# Patient Record
Sex: FEMALE | Race: WHITE | NOT HISPANIC OR LATINO | Employment: FULL TIME | URBAN - METROPOLITAN AREA
[De-identification: names, ages, dates, MRNs, and addresses within clinical notes are randomized per-mention and may not be internally consistent; named-entity substitution may affect disease eponyms.]

---

## 2023-05-18 ENCOUNTER — OFFICE VISIT (OUTPATIENT)
Dept: FAMILY MEDICINE | Facility: CLINIC | Age: 28
End: 2023-05-18
Payer: COMMERCIAL

## 2023-05-18 VITALS
HEIGHT: 64 IN | SYSTOLIC BLOOD PRESSURE: 120 MMHG | DIASTOLIC BLOOD PRESSURE: 68 MMHG | OXYGEN SATURATION: 97 % | WEIGHT: 181.13 LBS | BODY MASS INDEX: 30.92 KG/M2 | TEMPERATURE: 97.8 F | HEART RATE: 84 BPM

## 2023-05-18 DIAGNOSIS — Z11.59 NEED FOR HEPATITIS C SCREENING TEST: ICD-10-CM

## 2023-05-18 DIAGNOSIS — Z11.4 ENCOUNTER FOR SCREENING FOR HUMAN IMMUNODEFICIENCY VIRUS (HIV): ICD-10-CM

## 2023-05-18 DIAGNOSIS — N02.B9 IGA NEPHROPATHY: Primary | ICD-10-CM

## 2023-05-18 LAB
ANION GAP SERPL CALCULATED.3IONS-SCNC: 10 MMOL/L (ref 7–15)
BUN SERPL-MCNC: 17.6 MG/DL (ref 6–20)
CALCIUM SERPL-MCNC: 9.4 MG/DL (ref 8.6–10)
CHLORIDE SERPL-SCNC: 103 MMOL/L (ref 98–107)
CREAT SERPL-MCNC: 1.03 MG/DL (ref 0.51–0.95)
DEPRECATED HCO3 PLAS-SCNC: 29 MMOL/L (ref 22–29)
GFR SERPL CREATININE-BSD FRML MDRD: 76 ML/MIN/1.73M2
GLUCOSE SERPL-MCNC: 98 MG/DL (ref 70–99)
POTASSIUM SERPL-SCNC: 3.7 MMOL/L (ref 3.4–5.3)
SODIUM SERPL-SCNC: 142 MMOL/L (ref 136–145)

## 2023-05-18 PROCEDURE — 36415 COLL VENOUS BLD VENIPUNCTURE: CPT | Performed by: FAMILY MEDICINE

## 2023-05-18 PROCEDURE — 86803 HEPATITIS C AB TEST: CPT | Performed by: FAMILY MEDICINE

## 2023-05-18 PROCEDURE — 99204 OFFICE O/P NEW MOD 45 MIN: CPT | Performed by: FAMILY MEDICINE

## 2023-05-18 PROCEDURE — 80048 BASIC METABOLIC PNL TOTAL CA: CPT | Performed by: FAMILY MEDICINE

## 2023-05-18 PROCEDURE — 87389 HIV-1 AG W/HIV-1&-2 AB AG IA: CPT | Performed by: FAMILY MEDICINE

## 2023-05-18 RX ORDER — LISINOPRIL 5 MG/1
5 TABLET ORAL DAILY
Qty: 90 TABLET | Refills: 4 | Status: SHIPPED | OUTPATIENT
Start: 2023-05-18 | End: 2023-07-18

## 2023-05-18 NOTE — PROGRESS NOTES
"  Assessment & Plan     ASSESSMENT/ORDERS:    ICD-10-CM    1. IgA nephropathy  N02.8 Adult Nephrology  Referral     lisinopril (ZESTRIL) 5 MG tablet     Basic metabolic panel  (Ca, Cl, CO2, Creat, Gluc, K, Na, BUN)     Basic metabolic panel  (Ca, Cl, CO2, Creat, Gluc, K, Na, BUN)      2. Need for hepatitis C screening test  Z11.59 Hepatitis C Screen Reflex to HCV RNA Quant and Genotype     Hepatitis C Screen Reflex to HCV RNA Quant and Genotype      3. Encounter for screening for human immunodeficiency virus (HIV)  Z11.4 HIV Antigen Antibody Combo     HIV Antigen Antibody Combo        PLAN:  1.  Referral to nephrology to establish care regarding management of IgA nephropathy and to follow her care when she decides to become pregnant.  Refilled lisinopril today, labs as above.  2.  Appointment for preventative visit with IUD removal recommended this summer.  Noted that I recommended she start prenatal vitamin now and know that she needs to be off ACE-I when she gets pregnant.     Follow-up Visit   Expected date:  Jun 18, 2023 (Approximate)      Follow Up Appointment Details:     Follow-up with whom?: Me    Follow-Up for what?: Adult Preventive    Any Additional Chronic Condition Management?: General (Other)    Additional Details: pap and IUD removal    How?: In Person                               BMI:   Estimated body mass index is 31.09 kg/m  as calculated from the following:    Height as of this encounter: 1.626 m (5' 4\").    Weight as of this encounter: 82.2 kg (181 lb 2 oz).           Matteo Choe MD  [facility] TIERA Iyer is a 27 year old, presenting for the following health issues:  Establish Care and Consult (IUD REMOVAL/)        5/18/2023     3:03 PM   Additional Questions   Roomed by Radha COLINDRES MA     History of Present Illness       Reason for visit:  To establish care and schedule apt. to remove IUD    She eats 2-3 servings of fruits and vegetables " "daily.She consumes 0 sweetened beverage(s) daily.She exercises with enough effort to increase her heart rate 30 to 60 minutes per day.  She exercises with enough effort to increase her heart rate 5 days per week.   She is taking medications regularly.           Establishing care.  History of IgA nephropathy.  Has not seen nephrology for a few years.  Was on ACE-I for disease treatment.      Interested in IUD removal, wants to know how to get this done.  Had severe pain when placed 2 years ago.      Also due for pap test.    Review of Systems         Objective    /68   Pulse 84   Temp 97.8  F (36.6  C) (Temporal)   Ht 1.626 m (5' 4\")   Wt 82.2 kg (181 lb 2 oz)   SpO2 97%   BMI 31.09 kg/m    Body mass index is 31.09 kg/m .  Physical Exam  Constitutional:       Appearance: She is well-developed.   Cardiovascular:      Rate and Rhythm: Normal rate and regular rhythm.      Heart sounds: Normal heart sounds, S1 normal and S2 normal. No murmur heard.  Pulmonary:      Effort: Pulmonary effort is normal. No respiratory distress.      Breath sounds: Normal breath sounds. No wheezing, rhonchi or rales.   Neurological:      Mental Status: She is alert.                            "

## 2023-05-19 LAB
HCV AB SERPL QL IA: NONREACTIVE
HIV 1+2 AB+HIV1 P24 AG SERPL QL IA: NONREACTIVE

## 2023-05-22 NOTE — RESULT ENCOUNTER NOTE
Shireen,  Your results of your kidney function are consistent with your last check.  Your other screenings were negative.  It was great to meet you last week and I see you have your preventative appointment set up for July.  Please let me know if you have any questions before then.    Sincerely,  Dr. Choe

## 2023-06-01 ENCOUNTER — HEALTH MAINTENANCE LETTER (OUTPATIENT)
Age: 28
End: 2023-06-01

## 2023-06-12 NOTE — CONFIDENTIAL NOTE
DIAGNOSIS:  IgA nephropathy   DATE RECEIVED: 09.14.2023   NOTES STATUS DETAILS   OFFICE NOTE from referring provider Internal 05.18.2023 Matteo Choe MD   OFFICE NOTE from other specialist      *Only VASCULITIS or LUPUS gather office notes for the following     *PULMONARY       *ENT     *DERMATOLOGY     *RHEUMATOLOGY     DISCHARGE SUMMARY from hospital     DISCHARGE REPORT from the ER     MEDICATION LIST Internal    IMAGING  (NEED IMAGES AND REPORTS)     KIDNEY CT SCAN     KIDNEY ULTRASOUND     MR ABDOMEN     NUCLEAR MEDICINE RENAL     LABS     CBC Care Everywhere 10.27.2020   CMP Care Everywhere 10.27.2020   BMP Internal 05.18.2023   UA Care Everywhere 10.27.2020   URINE PROTEIN Care Everywhere 10.27.2020   RENAL PANEL     BIOPSY     KIDNEY BIOPSY

## 2023-07-18 ENCOUNTER — OFFICE VISIT (OUTPATIENT)
Dept: FAMILY MEDICINE | Facility: CLINIC | Age: 28
End: 2023-07-18
Attending: FAMILY MEDICINE
Payer: COMMERCIAL

## 2023-07-18 VITALS
HEIGHT: 64 IN | OXYGEN SATURATION: 98 % | HEART RATE: 78 BPM | BODY MASS INDEX: 30.39 KG/M2 | WEIGHT: 178 LBS | TEMPERATURE: 97.1 F | DIASTOLIC BLOOD PRESSURE: 66 MMHG | SYSTOLIC BLOOD PRESSURE: 118 MMHG | RESPIRATION RATE: 18 BRPM

## 2023-07-18 DIAGNOSIS — Z30.432 ENCOUNTER FOR REMOVAL OF INTRAUTERINE CONTRACEPTIVE DEVICE: ICD-10-CM

## 2023-07-18 DIAGNOSIS — N02.B9 IGA NEPHROPATHY: ICD-10-CM

## 2023-07-18 DIAGNOSIS — Z12.4 CERVICAL CANCER SCREENING: ICD-10-CM

## 2023-07-18 DIAGNOSIS — Z00.00 ROUTINE GENERAL MEDICAL EXAMINATION AT A HEALTH CARE FACILITY: Primary | ICD-10-CM

## 2023-07-18 LAB
CHOLEST SERPL-MCNC: 199 MG/DL
HDLC SERPL-MCNC: 74 MG/DL
LDLC SERPL CALC-MCNC: 108 MG/DL
NONHDLC SERPL-MCNC: 125 MG/DL
TRIGL SERPL-MCNC: 84 MG/DL

## 2023-07-18 PROCEDURE — G0145 SCR C/V CYTO,THINLAYER,RESCR: HCPCS | Performed by: FAMILY MEDICINE

## 2023-07-18 PROCEDURE — 99395 PREV VISIT EST AGE 18-39: CPT | Performed by: FAMILY MEDICINE

## 2023-07-18 PROCEDURE — 80061 LIPID PANEL: CPT | Performed by: FAMILY MEDICINE

## 2023-07-18 PROCEDURE — 36415 COLL VENOUS BLD VENIPUNCTURE: CPT | Performed by: FAMILY MEDICINE

## 2023-07-18 ASSESSMENT — ENCOUNTER SYMPTOMS
NERVOUS/ANXIOUS: 0
DIARRHEA: 0
JOINT SWELLING: 0
CONSTIPATION: 0
HEADACHES: 0
NAUSEA: 0
BREAST MASS: 0
HEMATOCHEZIA: 0
ARTHRALGIAS: 0
WEAKNESS: 0
SORE THROAT: 0
SHORTNESS OF BREATH: 0
CHILLS: 0
FEVER: 0
MYALGIAS: 0
HEARTBURN: 0
ABDOMINAL PAIN: 0
EYE PAIN: 0
COUGH: 0
PARESTHESIAS: 0
DYSURIA: 0
FREQUENCY: 0
HEMATURIA: 0
PALPITATIONS: 0
DIZZINESS: 0

## 2023-07-18 NOTE — RESULT ENCOUNTER NOTE
Shireen,  Your results are clinically good.  Please let me know if you have any questions.    Sincerely,  Dr. Choe

## 2023-07-18 NOTE — PATIENT INSTRUCTIONS
[employer] sites for health information:  American Academy of Pediatrics parent information:  www.healthychildren.org  American Academy of Family Physicians patient information:  www.familydoctor.org  [place] Department of Health:  www.health.[facility].[place]  Center for Disease Control and Prevention:  www.cdc.gov    Preventive Health Recommendations  Female Ages 26 - 39  Yearly exam:   See your health care provider every year in order to  Review health changes.   Discuss preventive care.    Review your medicines if you your doctor has prescribed any.    Until age 30: Get a Pap test every three years (more often if you have had an abnormal result).    After age 30: Talk to your doctor about whether you should have a Pap test every 3 years or have a Pap test with HPV screening every 5 years.   You do not need a Pap test if your uterus was removed (hysterectomy) and you have not had cancer.  You should be tested each year for STDs (sexually transmitted diseases), if you're at risk.   Talk to your provider about how often to have your cholesterol checked.  If you are at risk for diabetes, you should have a diabetes test (fasting glucose).  Shots: Get a flu shot each year. Get a tetanus shot every 10 years.   Nutrition:   Eat at least 5 servings of fruits and vegetables each day.  Eat whole-grain bread, whole-wheat pasta and brown rice instead of white grains and rice.  Get adequate Calcium and Vitamin D.     Lifestyle  Exercise at least 150 minutes a week (30 minutes a day, 5 days of the week). This will help you control your weight and prevent disease.  Limit alcohol to one drink per day.  No smoking.   Wear sunscreen to prevent skin cancer.  See your dentist every six months for an exam and cleaning.

## 2023-07-18 NOTE — PROGRESS NOTES
SUBJECTIVE:   CC: Shireen is an 27 year old who presents for preventive health visit.       2023    10:47 AM   Additional Questions   Roomed by Radha COLINDRES MA     Healthy Habits:     Getting at least 3 servings of Calcium per day:  Yes    Bi-annual eye exam:  NO    Dental care twice a year:  Yes    Sleep apnea or symptoms of sleep apnea:  None    Diet:  Regular (no restrictions)    Frequency of exercise:  4-5 days/week    Duration of exercise:  45-60 minutes    Taking medications regularly:  Yes    Medication side effects:  None    Additional concerns today:  No      History of IgA nephropathy. On ACE-I.  Wants IUD out today.  Planning to get pregnant at some point.             Have you ever done Advance Care Planning? (For example, a Health Directive, POLST, or a discussion with a medical provider or your loved ones about your wishes): No, advance care planning information given to patient to review.  Patient declined advance care planning discussion at this time.    Social History     Tobacco Use     Smoking status: Never     Smokeless tobacco: Never   Substance Use Topics     Alcohol use: Not Currently     Comment: Will have 1 drink a month             2023    12:18 AM   Alcohol Use   Prescreen: >3 drinks/day or >7 drinks/week? No     Reviewed orders with patient.  Reviewed health maintenance and updated orders accordingly - Yes      Breast Cancer Screenin/18/2023    12:19 AM   Breast CA Risk Assessment (FHS-7)   Do you have a family history of breast, colon, or ovarian cancer? No / Unknown         Patient under 40 years of age: Routine Mammogram Screening not recommended.   Pertinent mammograms are reviewed under the imaging tab.    History of abnormal Pap smear: NO - age 21-29 PAP every 3 years recommended     Reviewed and updated as needed this visit by clinical staff   Tobacco  Allergies  Meds              Reviewed and updated as needed this visit by Provider                "      Review of Systems   Constitutional:  Negative for chills and fever.   HENT:  Negative for congestion, ear pain, hearing loss and sore throat.    Eyes:  Negative for pain and visual disturbance.   Respiratory:  Negative for cough and shortness of breath.    Cardiovascular:  Negative for chest pain, palpitations and peripheral edema.   Gastrointestinal:  Negative for abdominal pain, constipation, diarrhea, heartburn, hematochezia and nausea.   Breasts:  Negative for tenderness, breast mass and discharge.   Genitourinary:  Negative for dysuria, frequency, genital sores, hematuria, pelvic pain, urgency, vaginal bleeding and vaginal discharge.   Musculoskeletal:  Negative for arthralgias, joint swelling and myalgias.   Skin:  Negative for rash.   Neurological:  Negative for dizziness, weakness, headaches and paresthesias.   Psychiatric/Behavioral:  Negative for mood changes. The patient is not nervous/anxious.           OBJECTIVE:   /66   Pulse 78   Temp 97.1  F (36.2  C) (Temporal)   Resp 18   Ht 1.626 m (5' 4\")   Wt 80.7 kg (178 lb)   LMP  (LMP Unknown)   SpO2 98%   BMI 30.55 kg/m    Physical Exam  Exam conducted with a chaperone present.   Constitutional:       General: She is not in acute distress.     Appearance: Normal appearance. She is well-developed.   HENT:      Right Ear: Hearing, tympanic membrane, ear canal and external ear normal.      Left Ear: Hearing, tympanic membrane, ear canal and external ear normal.      Nose: Nose normal.      Mouth/Throat:      Pharynx: Uvula midline. No oropharyngeal exudate or posterior oropharyngeal erythema.   Eyes:      General: Lids are normal.         Right eye: No discharge.         Left eye: No discharge.      Conjunctiva/sclera: Conjunctivae normal.      Pupils: Pupils are equal, round, and reactive to light.   Neck:      Thyroid: No thyromegaly.      Trachea: No tracheal deviation.   Cardiovascular:      Rate and Rhythm: Normal rate and regular " rhythm.      Pulses: Normal pulses.      Heart sounds: Normal heart sounds, S1 normal and S2 normal. No murmur heard.     No friction rub. No S3 or S4 sounds.   Pulmonary:      Effort: Pulmonary effort is normal. No respiratory distress.      Breath sounds: Normal breath sounds. No wheezing or rales.   Abdominal:      General: Bowel sounds are normal.      Palpations: Abdomen is soft. There is no mass.      Tenderness: There is no abdominal tenderness.   Genitourinary:     Labia:         Right: No rash, tenderness, lesion or injury.         Left: No rash, tenderness, lesion or injury.       Vagina: No vaginal discharge or bleeding.      Cervix: No discharge or friability.      Comments: Pap obtained.  IUD string visualized coming from cervix.  Ring forceps used to clamp onto string and gentle traction was used to completely remove device.  It was inspected, found to be intact, and discarded per facility protocol.   Musculoskeletal:         General: Normal range of motion.      Cervical back: Normal range of motion and neck supple.   Lymphadenopathy:      Cervical: No cervical adenopathy.      Upper Body:      Right upper body: No supraclavicular adenopathy.      Left upper body: No supraclavicular adenopathy.   Skin:     General: Skin is warm and dry.      Findings: No rash.   Neurological:      Mental Status: She is alert and oriented to person, place, and time.      Cranial Nerves: No cranial nerve deficit.      Sensory: No sensory deficit.      Motor: No abnormal muscle tone.      Deep Tendon Reflexes: Reflexes are normal and symmetric.   Psychiatric:         Thought Content: Thought content normal.         Judgment: Judgment normal.               ASSESSMENT/PLAN:   ASSESSMENT/ORDERS:    ICD-10-CM    1. Routine general medical examination at a health care facility  Z00.00       2. Cervical cancer screening  Z12.4 Pap Screen reflex to HPV if ASCUS - recommend age 25 - 29      3. IgA nephropathy  N02.8 Lipid panel  "reflex to direct LDL Non-fasting     Lipid panel reflex to direct LDL Non-fasting      4. Encounter for removal of intrauterine contraceptive device  Z30.432         PLAN:  1.  Has nephrology appointment in 6 weeks.  Will have them give recommendations now that she needs to stop ACE-I due to possible pregnancy.  Lipid done in advance of that appointment, statin will not be recommended for possible planned pregnancy.          COUNSELING:  Reviewed preventive health counseling, as reflected in patient instructions      BMI:   Estimated body mass index is 30.55 kg/m  as calculated from the following:    Height as of this encounter: 1.626 m (5' 4\").    Weight as of this encounter: 80.7 kg (178 lb).   Weight management plan: Discussed healthy diet and exercise guidelines      She reports that she has never smoked. She has never used smokeless tobacco.          Matteo Choe MD  [facility]  "

## 2023-07-22 LAB
BKR LAB AP GYN ADEQUACY: NORMAL
BKR LAB AP GYN INTERPRETATION: NORMAL
BKR LAB AP HPV REFLEX: NORMAL
BKR LAB AP PREVIOUS ABNORMAL: NORMAL
PATH REPORT.COMMENTS IMP SPEC: NORMAL
PATH REPORT.COMMENTS IMP SPEC: NORMAL
PATH REPORT.RELEVANT HX SPEC: NORMAL

## 2023-08-29 DIAGNOSIS — N02.B9 IGA NEPHROPATHY: Primary | ICD-10-CM

## 2023-09-14 ENCOUNTER — PRE VISIT (OUTPATIENT)
Dept: NEPHROLOGY | Facility: CLINIC | Age: 28
End: 2023-09-14
Payer: COMMERCIAL

## 2024-06-18 ENCOUNTER — PATIENT OUTREACH (OUTPATIENT)
Dept: CARE COORDINATION | Facility: CLINIC | Age: 29
End: 2024-06-18
Payer: COMMERCIAL

## 2024-07-02 ENCOUNTER — PATIENT OUTREACH (OUTPATIENT)
Dept: CARE COORDINATION | Facility: CLINIC | Age: 29
End: 2024-07-02
Payer: COMMERCIAL

## 2024-07-23 ENCOUNTER — VIRTUAL VISIT (OUTPATIENT)
Dept: FAMILY MEDICINE | Facility: CLINIC | Age: 29
End: 2024-07-23
Payer: COMMERCIAL

## 2024-07-23 DIAGNOSIS — Z34.90 SUPERVISION OF NORMAL PREGNANCY: Primary | ICD-10-CM

## 2024-07-23 PROCEDURE — 99207 PR NO CHARGE NURSE ONLY: CPT | Mod: 93

## 2024-07-23 NOTE — PATIENT INSTRUCTIONS
Learning About Pregnancy  Your Care Instructions     Your health in the early weeks of your pregnancy is particularly important for your baby's health. Take good care of yourself. Anything you do that harms your body can also harm your baby.  Make sure to go to all of your doctor appointments. Regular checkups will help keep you and your baby healthy.  How can you care for yourself at home?  Diet    Choose healthy foods like fruits, vegetables, whole grains, lean proteins, and healthy fats.     Choose foods that are good sources of calcium, iron, and folate. You can try dairy products, dark leafy greens, fortified orange juice and cereals, almonds, broccoli, dried fruit, and beans.     Do not skip meals or go for many hours without eating. If you are nauseated, try to eat a small, healthy snack every 2 to 3 hours.     Avoid fish that are high in mercury. These include shark, swordfish, samantha mackerel, marlin, orange roughy, and bigeye tuna, as well as tilefish from the [facility].     It's okay to eat up to 8 to 12 ounces a week of fish that are low in mercury or up to 4 ounces a week of fish that have medium levels of mercury. Some fish that are low in mercury are salmon, shrimp, canned light tuna, cod, and tilapia. Some fish that have medium levels of mercury are halibut and white albacore tuna.     Drink plenty of fluids. If you have kidney, heart, or liver disease and have to limit fluids, talk with your doctor before you increase the amount of fluids you drink.     Limit caffeine to about 200 to 300 mg per day. On average, a cup of brewed coffee has around 80 to 100 mg of caffeine.     Do not drink alcohol, such as beer, wine, or hard liquor.     Take a multivitamin that contains at least 400 micrograms (mcg) of folic acid to help prevent birth defects. Fortified cereal and whole wheat bread are good additional sources of folic acid.     Increase the calcium in your diet. Try to drink a quart of skim milk  each day. You may also take calcium supplements and choose foods such as cheese and yogurt.   Lifestyle    Make sure you go to your follow-up appointments.     Get plenty of rest. You may be unusually tired while you are pregnant.     Get at least 30 minutes of exercise on most days of the week. Walking is a good choice. If you have not exercised in the past, start out slowly. Take several short walks each day.     Do not smoke. If you need help quitting, talk to your doctor about stop-smoking programs. These can increase your chances of quitting for good.     Do not touch cat feces or litter boxes. Also, wash your hands after you handle raw meat, and fully cook all meat before you eat it. Wear gloves when you work in the yard or garden, and wash your hands well when you are done. Cat feces, raw or undercooked meat, and contaminated dirt can cause an infection that may harm your baby or lead to a miscarriage.     Avoid things that can make your body too hot and may be harmful to your baby, such as a hot tub or sauna. Or talk with your doctor before doing anything that raises your body temperature. Your doctor can tell you if it's safe.     Avoid chemical fumes, paint fumes, or poisons.     Do not use illegal drugs, marijuana, or alcohol.   Medicines    Review all of your medicines with your doctor. Some of your routine medicines may need to be changed to protect your baby.     Use acetaminophen (Tylenol) to relieve minor problems, such as a mild headache or backache or a mild fever with cold symptoms. Do not use nonsteroidal anti-inflammatory drugs (NSAIDs), such as ibuprofen (Advil, Motrin) or naproxen (Aleve), unless your doctor says it is okay.     Do not take two or more pain medicines at the same time unless the doctor told you to. Many pain medicines have acetaminophen, which is Tylenol. Too much acetaminophen (Tylenol) can be harmful.     Take your medicines exactly as prescribed. Call your doctor if you  "think you are having a problem with your medicine.   To manage morning sickness    Keep food in your stomach, but not too much at once. Try eating five or six small meals a day instead of three large meals.     For nausea when you wake up, eat a small snack, such as a couple of crackers or pretzels, before rising. Allow a few minutes for your stomach to settle before you slowly get up.     Try to avoid smells and foods that make you feel nauseated. High-fat or greasy foods, milk, and coffee may make nausea worse. Some foods that may be easier to tolerate include cold, spicy, sour, and salty foods.     Drink enough fluids. Water and other caffeine-free drinks are good choices.     Take your prenatal vitamins at night on a full stomach.     Try foods and drinks made with latrice. Latrice may help with nausea.     Get lots of rest. Morning sickness may be worse when you are tired.     Talk to your doctor about over-the-counter products, such as vitamin B6 or doxylamine, to help relieve symptoms.     Try a P6 acupressure wrist band. These anti-nausea wristbands help some people.   Follow-up care is a key part of your treatment and safety. Be sure to make and go to all appointments, and call your doctor if you are having problems. It's also a good idea to know your test results and keep a list of the medicines you take.  Where can you learn more?  Go to https://www.Ink361.net/patiented  Enter E868 in the search box to learn more about \"Learning About Pregnancy.\"  Current as of: July 10, 2023               Content Version: 14.0    8639-1774 [employer].   Care instructions adapted under license by your healthcare professional. If you have questions about a medical condition or this instruction, always ask your healthcare professional. [employer] disclaims any warranty or liability for your use of this information.      Weeks 6 to 10 of Your Pregnancy: Care Instructions  During these weeks of " "pregnancy, your body goes through many changes. You may start to feel different, both in your body and your emotions. Each pregnancy is different, so there's no \"right\" way to feel. These early weeks are a time to make healthy choices for you and your pregnancy.    Take a daily prenatal vitamin. Choose one with folic acid in it.    Avoid alcohol, tobacco, and drugs (including marijuana). If you need help quitting, talk to your doctor.    Drink plenty of liquids.  Be sure to drink enough water. And limit sodas, other sweetened drinks, and caffeine.     Choose foods that are good sources of calcium, iron, and folate.  You can try dairy products, dark leafy greens, fortified orange juice and cereals, almonds, broccoli, dried fruit, and beans.     Avoid foods that may be harmful.  Don't eat raw meat, deli meat, raw seafood, or raw eggs. Avoid soft cheese and unpasteurized dairy, like Brie and blue cheese. And don't eat fish that contains a lot of mercury, like shark and swordfish.     Don't touch carline litter or cat poop.  They can cause an infection that could be harmful during pregnancy.     Avoid things that can make your body too hot.  For example, avoid hot tubs and saunas.     Soothe morning sickness.  Try eating 5 or 6 small meals a day, getting some fresh air, or using matt to control symptoms.     Ask your doctor about flu and COVID-19 shots.  Getting them can help protect against infection.   Follow-up care is a key part of your treatment and safety. Be sure to make and go to all appointments, and call your doctor if you are having problems. It's also a good idea to know your test results and keep a list of the medicines you take.  Where can you learn more?  Go to https://www.Advanced Battery Concepts.net/patiented  Enter G112 in the search box to learn more about \"Weeks 6 to 10 of Your Pregnancy: Care Instructions.\"  Current as of: July 10, 2023               Content Version: 14.0    2852-1063 [employer]. "   Care instructions adapted under license by your healthcare professional. If you have questions about a medical condition or this instruction, always ask your healthcare professional. [employer] disclaims any warranty or liability for your use of this information.         Managing Morning Sickness (01:55)  Your health professional recommends that you watch this short online health video.  Learn tips for dealing with morning sickness, no matter what time of day you have it.  Purpose:  Gives tips for managing morning sickness, including eating small low-fat meals and avoiding caffeine and spicy food.  Goal:  The user will learn tips for dealing with morning sickness during pregnancy.     How to watch the video    Scan the QR code   OR Visit the website    [email or web address]   Current as of: July 10, 2023               Content Version: 14.0    8659-1917 [employer].   Care instructions adapted under license by your healthcare professional. If you have questions about a medical condition or this instruction, always ask your healthcare professional. [employer] disclaims any warranty or liability for your use of this information.      Pregnancy and Heartburn: Care Instructions  Overview     Heartburn is a common problem during pregnancy.  Heartburn happens when stomach acid backs up into the tube that carries food to the stomach. This tube is called the esophagus. Early in pregnancy, heartburn is caused by hormone changes that slow down digestion. Later on, it's also caused by the large uterus pushing up on the stomach.  Even though you can't fix the cause, there are things you can do to get relief. Treating heartburn during pregnancy focuses first on making lifestyle changes, like changing what and how you eat, and on taking medicines.  Heartburn usually improves or goes away after childbirth.  Follow-up care is a key part of your treatment and safety. Be sure to make  "and go to all appointments, and call your doctor if you are having problems. It's also a good idea to know your test results and keep a list of the medicines you take.  How can you care for yourself at home?  Eat small, frequent meals.  Avoid foods that make your symptoms worse, such as chocolate, peppermint, and spicy foods. Avoid drinks with caffeine, such as coffee, tea, and sodas.  Avoid bending over or lying down after meals.  Take a short walk after you eat.  If heartburn is a problem at night, do not eat for 2 hours before bedtime.  Take antacids like Mylanta, Maalox, Rolaids, or Tums. Do not take antacids that have sodium bicarbonate, magnesium trisilicate, or aspirin. Be careful when you take over-the-counter antacid medicines. Many of these medicines have aspirin in them. While you are pregnant, do not take aspirin or medicines that contain aspirin unless your doctor says it is okay.  If you're not getting relief, talk to your doctor. You may be able to take a stronger acid-reducing medicine.  When should you call for help?   Call your doctor now or seek immediate medical care if:    You have new or worse belly pain.     You are vomiting.   Watch closely for changes in your health, and be sure to contact your doctor if:    You have new or worse symptoms of reflux.     You are losing weight.     You have trouble or pain swallowing.     You do not get better as expected.   Where can you learn more?  Go to https://[email or web address].[email or web address] / Founders.net/patiented  Enter U946 in the search box to learn more about \"Pregnancy and Heartburn: Care Instructions.\"  Current as of: July 10, 2023               Content Version: 14.0    8575-4232 [employer].   Care instructions adapted under license by your healthcare professional. If you have questions about a medical condition or this instruction, always ask your healthcare professional. [employer] disclaims any warranty or liability for your use of this " information.      Constipation: Care Instructions  Overview     Constipation means that you have a hard time passing stools (bowel movements). People pass stools from 3 times a day to once every 3 days. What is normal for you may be different. Constipation may occur with pain in the rectum and cramping. The pain may get worse when you try to pass stools. Sometimes there are small amounts of bright red blood on toilet paper or the surface of stools. This is because of enlarged veins near the rectum (hemorrhoids).  A few changes in your diet and lifestyle may help you avoid ongoing constipation. Your doctor may also prescribe medicine to help loosen your stool.  Some medicines can cause constipation. These include pain medicines and antidepressants. Tell your doctor about all the medicines you take. Your doctor may want to make a medicine change to ease your symptoms.  Follow-up care is a key part of your treatment and safety. Be sure to make and go to all appointments, and call your doctor if you are having problems. It's also a good idea to know your test results and keep a list of the medicines you take.  How can you care for yourself at home?  Drink plenty of fluids. If you have kidney, heart, or liver disease and have to limit fluids, talk with your doctor before you increase the amount of fluids you drink.  Include high-fiber foods in your diet each day. These include fruits, vegetables, beans, and whole grains.  Get at least 30 minutes of exercise on most days of the week. Walking is a good choice. You also may want to do other activities, such as running, swimming, cycling, or playing tennis or team sports.  Take a fiber supplement, such as Citrucel or Metamucil, every day. Read and follow all instructions on the label.  Schedule time each day for a bowel movement. A daily routine may help. Take your time having a bowel movement, but don't sit for more than 10 minutes at a time. And don't strain too  "much.  Support your feet with a small step stool when you sit on the toilet. This helps flex your hips and places your pelvis in a squatting position.  Your doctor may recommend an over-the-counter laxative to relieve your constipation. Examples are Milk of Magnesia and MiraLax. Read and follow all instructions on the label. Do not use laxatives on a long-term basis.  When should you call for help?   Call your doctor now or seek immediate medical care if:    You have new or worse belly pain.     You have new or worse nausea or vomiting.     You have blood in your stools.   Watch closely for changes in your health, and be sure to contact your doctor if:    Your constipation is getting worse.     You do not get better as expected.   Where can you learn more?  Go to https://www.trueAnthem.net/patiented  Enter P343 in the search box to learn more about \"Constipation: Care Instructions.\"  Current as of: October 19, 2023               Content Version: 14.0    6864-7732 [employer].   Care instructions adapted under license by your healthcare professional. If you have questions about a medical condition or this instruction, always ask your healthcare professional. [employer] disclaims any warranty or liability for your use of this information.      Learning About High-Iron Foods  What foods are high in iron?     The foods you eat contain nutrients, such as vitamins and minerals. Iron is a nutrient. Your body needs the right amount to stay healthy and work as it should. You can use the list below to help you make choices about which foods to eat.  Here are some foods that contain iron. They have 1 to 2 milligrams of iron per serving.  Fruits  Figs (dried), 5 figs  Vegetables  Asparagus (canned), 6 russ  Gideon, beet, Swiss chard, or turnip greens, 1 cup  Dried peas, cooked,   cup  Seaweed, spirulina (dried),   cup  Spinach, (cooked)   cup or (raw) 1 cup  Grains  Cereals, fortified with iron, 1 " "cup  Grits (instant, cooked), fortified with iron,   cup  Meats and other protein foods  Beans (kidney, lima, navy, white), canned or cooked,   cup  Beef or lamb, 3 oz  Chicken giblets, 3 oz  Chickpeas (garbanzo beans),   cup  Liver of beef, lamb, or pork, 3 oz  Oysters (cooked), 3 oz  Sardines (canned), 3 oz  Soybeans (boiled),   cup  Tofu (firm),   cup  Work with your doctor to find out how much of this nutrient you need. Depending on your health, you may need more or less of it in your diet.  Where can you learn more?  Go to https://www.Turing Data.net/patiented  Enter R005 in the search box to learn more about \"Learning About High-Iron Foods.\"  Current as of: September 20, 2023               Content Version: 14.0    8511-0154 [employer].   Care instructions adapted under license by your healthcare professional. If you have questions about a medical condition or this instruction, always ask your healthcare professional. [employer] disclaims any warranty or liability for your use of this information.      Rh Antibodies Screening During Pregnancy: About This Test  What is it?     The Rh antibodies screening test is a blood test. It checks your blood for Rh antibodies. If you have Rh-negative blood and have been exposed to Rh-positive blood, your immune system may make antibodies to attack the Rh-positive blood. When a pregnant woman has these antibodies, it is called Rh sensitization.  Why is this test done?  The Rh antibodies screening test is done during pregnancy to find out if your baby is at risk for Rh disease. This can happen if you have Rh-negative blood and your baby has Rh-positive blood. If your Rh-negative blood mixes with Rh-positive blood, your immune system will make antibodies to attack the Rh-positive blood.  During pregnancy, these antibodies could attach to the baby's red blood cells. This can cause your baby to have serious health problems. The results of this test " "will help your doctor know how to best care for you and your baby during your pregnancy.  How do you prepare for the test?  In general, there's nothing you have to do before this test, unless your doctor tells you to.  How is the test done?  A health professional uses a needle to take a blood sample, usually from the arm.  What happens after the test?  You will probably be able to go home right away. It depends on the reason for the test.  You can go back to your usual activities right away.  Follow-up care is a key part of your treatment and safety. Be sure to make and go to all appointments, and call your doctor if you are having problems. It's also a good idea to keep a list of the medicines you take. Ask your doctor when you can expect to have your test results.  Where can you learn more?  Go to https://www.FIMBex.Hudgeons [employer] Temple/patiented  Enter P722 in the search box to learn more about \"Rh Antibodies Screening During Pregnancy: About This Test.\"  Current as of: July 10, 2023               Content Version: 14.0    2344-0177 [employer].   Care instructions adapted under license by your healthcare professional. If you have questions about a medical condition or this instruction, always ask your healthcare professional. [employer] disclaims any warranty or liability for your use of this information.      Learning About Preventing Rh Disease  What is Rh disease?     Rh disease can be a serious problem in pregnancy. It happens when substances called antibodies in the mother's blood cause red blood cells in her baby's blood to be destroyed. This can occur when the blood types of a mother and her baby do not match.  All blood has an Rh factor. This is what makes a blood type positive or negative. When you are Rh-negative, your baby may be Rh-negative or Rh-positive. If your baby has Rh-positive blood and it mixes with yours, your body will make antibodies. This is called Rh sensitization.  Most of " "the time, this is not a problem in a first pregnancy. But in future pregnancies, it could cause Rh disease.  A  with Rh disease has mild anemia and may have jaundice. In severe cases, anemia, jaundice, and swelling can be very dangerous or fatal. Some babies need to be delivered early. Some need special care in the NICU. A very sick baby will need a blood transfusion before or after birth.  Fortunately, Rh sensitization is usually easy to prevent.  That's why it's important to get your Rh status checked in your first trimester. It doesn't cause any warning signs. A blood test is the only way to know if you are Rh-sensitive or are at risk for it.  How can you prevent Rh disease?  If you are Rh-negative, your doctor gives you an Rh immune globulin shot (such as RhoGAM). It helps prevent your body from making the antibodies that attack your baby's red blood cells.  Timing is important. You need the shot at certain times during your pregnancy. And you need one anytime there is a chance that your baby's blood might mix with yours. That can happen with certain prenatal tests or when you have pregnancy bleeding, such as:  Right after any pregnancy loss, amniocentesis, or CVS testing.  After turning of a breech baby.  Before and maybe after childbirth. Your doctor gives you a shot around week 28. If your  is Rh-positive, you will have another shot.  Follow-up care is a key part of your treatment and safety. Be sure to make and go to all appointments, and call your doctor if you are having problems. It's also a good idea to know your test results and keep a list of the medicines you take.  Where can you learn more?  Go to https://www.Axentra.net/patiented  Enter W177 in the search box to learn more about \"Learning About Preventing Rh Disease.\"  Current as of: July 10, 2023               Content Version: 14.0    4318-6368 [employer].   Care instructions adapted under license by [facility] healthcare " professional. If you have questions about a medical condition or this instruction, always ask your healthcare professional. Healthwise, [facility] disclaims any warranty or liability for your use of this information.      Learning About Rh Immunoglobulin Shots  Introduction     An Rh immunoglobulin shot is given to pregnant women who have Rh-negative blood.  You may have Rh-negative blood, and your baby may have Rh-positive blood. If the two types of blood mix, your body will make antibodies. This is called Rh sensitization. Most of the time, this is not a problem the first time you're pregnant. But it could cause problems in future pregnancies.  This shot keeps your body from making the antibodies. You get the shot around 28 weeks of pregnancy. After the birth, your baby's blood is tested. If the blood is Rh positive, you will get another shot. You may also get the shot if you have vaginal bleeding while you are pregnant or if you have a miscarriage. These shots protect future pregnancies.  Women with Rh negative blood will need this shot each time they get pregnant.  Example  Rh immunoglobulin (HypRho-D, MICRhoGAM, and RhoGAM)  Possible side effects  Rare side effects may include:  Some mild pain where you got the shot.  A slight fever.  An allergic reaction.  You may have other side effects not listed here. Check the information that comes with your medicine.  What to know about taking this medicine  You may need more than one shot. You may need the shot again:  After amniocentesis, fetal blood sampling, or chorionic villus sampling tests.  If you have bleeding in your second or third trimester.  After turning of a breech baby.  After an injury to the belly while you are pregnant.  After a miscarriage or an .  Before or right after treatment for an ectopic or a partial molar pregnancy.  Tell your doctor if you have any allergies or have had a bad response to medicines in the past.  If you get this shot  "within 3 months of getting a live-virus vaccine, the vaccine may not work. Your doctor will tell you if you need more vaccine.  Check with your doctor or pharmacist before you use any other medicines. This includes over-the-counter medicines. Make sure your doctor knows all of the medicines, vitamins, herbs, and supplements you take. Taking some medicines at the same time can cause problems.  Where can you learn more?  Go to https://www.MotorExchange.[employer]ations/patiented  Enter V615 in the search box to learn more about \"Learning About Rh Immunoglobulin Shots.\"  Current as of: July 10, 2023               Content Version: 14.0    0092-3465 [employer].   Care instructions adapted under license by your healthcare professional. If you have questions about a medical condition or this instruction, always ask your healthcare professional. [employer] disclaims any warranty or liability for your use of this information.      Rubella (Romanian Measles): Care Instructions  Overview  Rubella, also called Romanian measles or 3-day measles, is a disease caused by a virus. It spreads by coughs, sneezes, and close contact. Rubella usually is mild and does not cause long-term problems. But if you are pregnant and get it, you can give the disease to your unborn baby. This can cause serious birth defects.  While you have rubella, you may get a rash and a mild fever, and the lymph glands in your neck may swell. Older children often have a fever, eye pain, a sore throat, and body aches. You can relieve most symptoms with care at home. Avoid being around others, especially pregnant people, until your rash has been gone for at least 4 days. People who have not had this disease before or have not had the vaccine have the greatest chance of getting the virus.  Follow-up care is a key part of your treatment and safety. Be sure to make and go to all appointments, and call your doctor if you are having problems. It's also a good " "idea to know your test results and keep a list of the medicines you take.  How can you care for yourself at home?  Drink plenty of fluids. If you have kidney, heart, or liver disease and have to limit fluids, talk with your doctor before you increase the amount of fluids you drink.  Get plenty of rest to help your body heal.  Take an over-the-counter pain medicine, such as acetaminophen (Tylenol), ibuprofen (Advil, Motrin), or naproxen (Aleve), to reduce fever and discomfort. Read and follow all instructions on the label. Do not give aspirin to anyone younger than 20. It has been linked to Reye syndrome, a serious illness.  Do not take two or more pain medicines at the same time unless the doctor told you to. Many pain medicines have acetaminophen, which is Tylenol. Too much acetaminophen (Tylenol) can be harmful.  Try not to scratch the rash. Put cold, wet cloths on the rash to reduce itching.  Do not smoke. Smoking can make your symptoms worse. If you need help quitting, talk to your doctor about stop-smoking programs and medicines. These can increase your chances of quitting for good.  Avoid contact with people who have never had rubella and who have not been immunized.  When should you call for help?   Call your doctor now or seek immediate medical care if:    You have a fever with a stiff neck or a severe headache.     You are sensitive to light or feel very sleepy or confused.   Watch closely for changes in your health, and be sure to contact your doctor if:    You do not get better as expected.   Where can you learn more?  Go to https://www.meinKauf.net/patiented  Enter B812 in the search box to learn more about \"Rubella (Greek Measles): Care Instructions.\"  Current as of: June 12, 2023               Content Version: 14.0    5047-2338 [employer].   Care instructions adapted under license by your healthcare professional. If you have questions about a medical condition or this instruction, " always ask your healthcare professional. [employer]wise, [facility] disclaims any warranty or liability for your use of this information.      Gonorrhea and Chlamydia: About These Tests  What is it?  These tests use a sample of urine or other body fluid to look for the bacteria that cause these sexually transmitted infections (STIs). The fluid sample can come from the cervix, vagina, rectum, throat, or eyes.  Why is this test done?  These tests may be done to:  Find out if symptoms are caused by gonorrhea or chlamydia.  Check people who are at high risk of being infected with gonorrhea or chlamydia.  Retest people several months after they have been treated for gonorrhea or chlamydia.  Check for infection in your  if you had a gonorrhea or chlamydia infection at the time of delivery.  How can you prepare for the test?  If you are going to have a urine test, do not urinate for at least 1 hour before the test.  If you think you may have chlamydia or gonorrhea, don't have sexual intercourse until you get your test results. And you may want to have tests for other STIs, such as HIV.  How is the test done?  For a direct sample, a swab is used to collect body fluid from the cervix, vagina, rectum, throat, or eyes. Your doctor may collect the sample. Or you may be given instructions on how to collect your own sample.  For a urine sample, you will collect the urine that comes out when you first start to urinate. Don't wipe the genital area clean before you urinate.  How long does the test take?  The test will take a few minutes.  What happens after the test?  You will be able to go home right away.  You can go back to your usual activities right away.  If you do have an infection, don't have sexual intercourse for 7 days after you start treatment. And your sex partner(s) should also be treated.  Follow-up care is a key part of your treatment and safety. Be sure to make and go to all appointments, and call your doctor  "if you are having problems. It's also a good idea to keep a list of the medicines you take. Ask your doctor when you can expect to have your test results.  Where can you learn more?  Go to https://www.KeyLemon.net/patiented  Enter K976 in the search box to learn more about \"Gonorrhea and Chlamydia: About These Tests.\"  Current as of: November 27, 2023               Content Version: 14.0    9477-2919 [employer].   Care instructions adapted under license by your healthcare professional. If you have questions about a medical condition or this instruction, always ask your healthcare professional. [employer] disclaims any warranty or liability for your use of this information.      Trichomoniasis: About This Test  What is it?     This test uses a sample of urine or other body fluid to look for the tiny parasite that causes trichomoniasis (also called trich). The fluid sample can come from the vagina, cervix, or urethra. Your doctor may choose to use one or more of many available tests.  Why is it done?  A trich test may be done to:  Find out if symptoms are caused by trich.  Check people who are at high risk for being infected with trich.  Check after treatment to make sure that the infection is gone.  How do you prepare for the test?  If you are going to have a urine test, do not urinate for at least 1 hour before the test.  How is the test done?  For a direct sample, a swab is used to collect body fluid from the cervix, vagina, or urethra. Your doctor may collect the sample. Or you may be given instructions on how to collect your own sample.  For a urine sample, you will collect the urine that comes out when you first start to urinate. Don't wipe the area clean before you urinate.  How long does the test take?  It will take a few minutes to collect a sample.  What happens after the test?  You can go home right away.  You can go back to your usual activities right away.  You may get the " test results the same day or several days later. It depends on the test used.  If you do have an infection, don't have sexual intercourse for 7 days after you start treatment. Your sex partner or partners should also be treated.  Follow-up care is a key part of your treatment and safety. Be sure to make and go to all appointments, and call your doctor if you are having problems. Ask your doctor when you can expect to have your test results.  Current as of: November 27, 2023               Content Version: 14.0    5468-0451 [facility].   Care instructions adapted under license by your healthcare professional. If you have questions about a medical condition or this instruction, always ask your healthcare professional. [facility] disclaims any warranty or liability for your use of this information.      HIV Testing: Care Instructions  Overview  You can get tested for the human immunodeficiency virus (HIV). Most doctors use a blood test to check for HIV antibodies and antigens in your blood. It may also check for the genetic material (RNA) of HIV. Some tests use saliva to check for HIV antibodies. But these aren't as accurate. For example, they may give a false result if you've just been infected.  What do the results mean?    Normal (negative)    No HIV antibodies, antigens, or RNA were found.  You may need more testing. It can make sure your test results are correct.    Uncertain (indeterminate)    Test results didn't clearly show if you have an HIV infection.  HIV antibodies or antigens may not have formed yet.  Some other type of antibody or antigen may have affected the results.  You will need another test to be sure.    Abnormal (positive)    HIV antibodies, antigens, or RNA were found.  If you haven't had an RNA test yet, one will be done. If it's positive, you have HIV.  If your test result is positive, your doctor will talk to you. You will discuss starting treatment.  Follow-up care  "is a key part of your treatment and safety. Be sure to make and go to all appointments, and call your doctor if you are having problems. It's also a good idea to know your test results and keep a list of the medicines you take.  Where can you learn more?  Go to https://www.Xiaozhu.com.net/patiented  Enter T792 in the search box to learn more about \"HIV Testing: Care Instructions.\"  Current as of: June 12, 2023               Content Version: 14.0    0519-4405 [employer].   Care instructions adapted under license by your healthcare professional. If you have questions about a medical condition or this instruction, always ask your healthcare professional. [facility] disclaims any warranty or liability for your use of this information.      Hepatitis C Virus Tests: About These Tests  What are they?     Hepatitis C virus tests are blood tests that check for substances in the blood that show whether you have hepatitis C now or had it in the past. The tests can also tell you what type of hepatitis C you have and how severe the disease is. This can help your doctor with treatment.  If the tests show that you have long-term hepatitis C, you need to take steps to prevent spreading the disease.  Why are these tests done?  You may need these tests if:  You have symptoms of hepatitis.  You may have been exposed to the virus. You are more likely to have been exposed to the virus if you inject drugs or are exposed to body fluids (such as if you are a health care worker).  You've had other tests that show you have liver problems.  You are 18 to 79 years old.  You have an HIV infection.  The tests also are done to help your doctor decide about your treatment and see how well it works.  How do you prepare for the test?  In general, there's nothing you have to do before this test, unless your doctor tells you to.  How is the test done?  A health professional uses a needle to take a blood sample, usually from " "the arm.  What happens after these tests?  You will probably be able to go home right away.  You can go back to your usual activities right away.  Follow-up care is a key part of your treatment and safety. Be sure to make and go to all appointments, and call your doctor if you are having problems. It's also a good idea to keep a list of the medicines you take. Ask your doctor when you can expect to have your test results.  Where can you learn more?  Go to https://www.[email or web address].net/patiented  Enter W551 in the search box to learn more about \"Hepatitis C Virus Tests: About These Tests.\"  Current as of: June 12, 2023               Content Version: 14.0    8872-8306 [employer].   Care instructions adapted under license by your healthcare professional. If you have questions about a medical condition or this instruction, always ask your healthcare professional. [employer] disclaims any warranty or liability for your use of this information.      Learning About Fetal Ultrasound Results  What is a fetal ultrasound?     Fetal ultrasound is a test that lets your doctor see an image of your baby. Your doctor learns information about your baby from this picture. You may find out, for example, if you are having a boy or a girl. But the main reason you have this test is to get information about your baby's health.  (You may hear your baby called a fetus. This is a common medical term for a baby that's growing in the mother's uterus.)  What kind of information can you learn from this test?  The findings of an ultrasound fall into two categories, normal and abnormal.  Normal  The fetus is the right size for its age.  The placenta is the expected size and does not cover the cervix.  There is enough amniotic fluid in the uterus.  No birth defects can be seen.  Abnormal  The fetus is small or large for its age.  The placenta covers the cervix.  There is too much or too little amniotic fluid in the " uterus.  The fetus may have a birth defect.  What does an abnormal result mean?  Abnormal seems to imply that something is wrong with your baby. But what it means is that the test has shown something the doctor wants to take a closer look at.  And that's what happens next. Your doctor will talk to you about what further test or tests you may need.  What do the results mean?  Some of the things your doctor may see on an abnormal ultrasound include:  Echogenic bowel.  The bowel looks very bright on the screen. This could mean that there's blood in the bowel. Or it could mean that something is blocking the small bowel.  Increased nuchal translucency.  The ultrasound measures the thickness at the back of the baby's neck. An increase in thickness is sometimes an early sign of Down syndrome.  Increased or decreased amniotic fluid.  The doctor will look for a reason for the level of amniotic fluid and will watch the pregnancy closely as it progresses.  Large ventricles.  Ventricles in the brain look larger than they should. Your doctor may take a closer look at the brain.  Renal pyelectasis/hydronephrosis.  The ultrasound measures the fluid around the kidney. If there is more fluid than expected, there is a chance of urinary tract or kidney problems.  Short long bones.  The ultrasound measures certain arm and leg bones. A long bone (humerus or femur) that is shorter than average could be a sign of Down syndrome.  Subchorionic hemorrhage.  An ultrasound can show bleeding under one of the membranes that surrounds the fetus. Some women don't have symptoms of bleeding. The ultrasound can find this problem when women are not bleeding from their vagina. Women who have this condition have a slightly higher chance of miscarriage.  What do you do now?  Take a deep breath, and let it out. Keep in mind that an abnormal finding on an ultrasound, after it's coupled with more information, may:  Turn out to be nothing.  Turn out to be  "something mild that won't affect the baby.  Turn out to be something more serious. But if this happens, early diagnosis helps you and your doctor plan treatment options sooner rather than later.  Your medical team is there for you. So are your family and friends. Ask questions, and get the help and support you need.  Follow-up care is a key part of your treatment and safety. Be sure to make and go to all appointments, and call your doctor if you are having problems. It's also a good idea to know your test results and keep a list of the medicines you take.  Where can you learn more?  Go to https://www.iMOSPHERE.net/patiented  Enter K451 in the search box to learn more about \"Learning About Fetal Ultrasound Results.\"  Current as of: July 10, 2023               Content Version: 14.0    9365-3536 [employer].   Care instructions adapted under license by your healthcare professional. If you have questions about a medical condition or this instruction, always ask your healthcare professional. [employer] disclaims any warranty or liability for your use of this information.      Learning About Prenatal Visits  Overview     Regular prenatal visits are very important during any pregnancy. These quick office visits may seem simple and routine. But they can help you have a safe and healthy pregnancy. Your doctor is watching for problems that can only be found through regular checkups. The visits also give you and your doctor time to build a good relationship.  After your first visit, you will most likely start on a schedule of monthly visits. In your third trimester, the visits will get more frequent. Based on your health, your age, and if you've had a normal, full-term pregnancy before, your doctor may want to see you more or less often.  At different times in your pregnancy, you will have exams and tests. Some are routine. Others are done only when there is a chance of a problem. Everything healthy " you do for your body helps you have a healthy pregnancy. Rest when you need it. Eat well, drink plenty of water, and exercise regularly.  What happens during a prenatal visit?  You will have blood pressure checks, along with urine tests. You also may have blood tests. If you need to go to the bathroom while waiting for the doctor, tell the nurse. You will be given a sample cup so your urine can be tested.  You will be weighed and have your belly measured.  Your doctor may listen to the fetal heartbeat with a special device.  At about 24 weeks, and possibly earlier in your pregnancy, your doctor will check your blood sugar (glucose tolerance test) for diabetes that can occur during pregnancy. This is gestational diabetes, which can be harmful.  You will have tests to check for infections that could harm your . These include group B streptococcus and hepatitis B.  Your doctor may do ultrasounds to check for problems. This also checks the position of the fetus. An ultrasound uses sound waves to produce a picture of the fetus.  You may get your vaccines updated.  Your doctor may ask you questions to check for signs of anxiety or depression. Tell your doctor if you feel sad, anxious, or hopeless for more than a few days.  You may have other tests at any time during your pregnancy.  Use your visits to discuss with your doctor any concerns you have.  How can you care for yourself at home?  Get plenty of rest.  Try to exercise every day, if your doctor says it is okay. If you have not exercised in the past, start out slowly. For example, you can take short walks each day.  Choose healthy foods, such as fruits, vegetables, whole grains, lean proteins, low-fat dairy, and healthy fats.  Drink plenty of fluids. Cut down on drinks with caffeine, such as coffee, tea, and cola. If you have kidney, heart, or liver disease and have to limit fluids, talk with your doctor before you increase the amount of fluids you drink.  Try  "to avoid chemical fumes, paint fumes, and poisons.  If you smoke, vape, or use alcohol, marijuana, or other drugs, quit or cut back as much as you can. Talk to your doctor if you need help quitting.  Review all of your medicines, including over-the-counter medicines and supplements, with your doctor. Some of your routine medicines may need to be changed. Do not stop or start taking any medicines without talking to your doctor first.  Follow-up care is a key part of your treatment and safety. Be sure to make and go to all appointments, and call your doctor if you are having problems. It's also a good idea to know your test results and keep a list of the medicines you take.  Where can you learn more?  Go to https://www.[employer].net/patiented  Enter J502 in the search box to learn more about \"Learning About Prenatal Visits.\"  Current as of: July 10, 2023               Content Version: 14.0    7022-1192 [employer].   Care instructions adapted under license by your healthcare professional. If you have questions about a medical condition or this instruction, always ask your healthcare professional. [employer] disclaims any warranty or liability for your use of this information.      Intimate Partner Violence: Care Instructions  Overview     If you want to save this information but don't think it is safe to take it home, see if a trusted friend can keep it for you. Plan ahead. Know who you can call for help, and memorize the phone number.   Be careful online too. Your online activity may be seen by others. Do not use your personal computer or device to read about this topic. Use a safe computer, such as one at work, a friend's home, or a library.    Intimate partner violence--a type of domestic abuse--is different from an argument now and then. It is a pattern of abuse that one person may use to control another person's behavior. It may start with threats and name-calling. Then, it may lead to " more serious acts, like pushing and slapping. The abuse also may occur in other areas. For example, the abuser may withhold money or spend a partner's money without their knowledge.  Abuse can cause serious harm. You are more likely to have a long-term health problem from the injuries and stress of living in a violent relationship. People who are sexually abused by their partners have more sexually transmitted infections and unplanned pregnancies. Anyone who is abused also faces emotional pain. Anyone can be abused in relationships. In some relationships, both people use abusive behavior.  If you are pregnant, abuse can cause problems such as poor weight gain, infections, and bleeding. Abuse during this time may increase your baby's risk of low birth weight, premature birth, and death.  Follow-up care is a key part of your treatment and safety. Be sure to make and go to all appointments, and call your doctor if you are having problems. It's also a good idea to know your test results and keep a list of the medicines you take.  How can you care for yourself at home?  If you do not have a safe place to stay, discuss this with your doctor before you leave.  Have a plan for where to go, how to leave your home, and where to stay in case of an emergency. Do not tell your partner about your plan. Contact:  The National Domestic Violence Hotline toll-free at 1-390.523.1201. They can help you find resources in your area.  Your local police department, hospital, or clinic for information about shelters and safe homes near you.  Talk to a trusted friend or neighbor, a counselor, or a darien leader. Do not feel that you have to hide what happened.  Teach your children how to call for help in an emergency.  Be alert to warning signs, such as threats, heavy alcohol use, or drug use. This can help you avoid danger.  If you can, make sure that there are no guns or other weapons in your home.  When should you call for help?   Call 231  "anytime you think you may need emergency care. For example, call if:    You or someone else has just been abused.     You think you or someone else is in danger of being abused.   Watch closely for changes in your health, and be sure to contact your doctor if you have any problems.  Where can you learn more?  Go to https://www.eCircle.net/patiented  Enter G282 in the search box to learn more about \"Intimate Partner Violence: Care Instructions.\"  Current as of: June 24, 2023               Content Version: 14.0    6412-2841 [employer].   Care instructions adapted under license by your healthcare professional. If you have questions about a medical condition or this instruction, always ask your healthcare professional. [employer] disclaims any warranty or liability for your use of this information.      Intimate Partner Violence Safety Instructions: Care Instructions  Overview     If you want to save this information but don't think it is safe to take it home, see if a trusted friend can keep it for you. Plan ahead. Know who you can call for help, and memorize the phone number.   Be careful online too. Your online activity may be seen by others. Do not use your personal computer or device to read about this topic. Use a safe computer, such as one at work, a friend's home, or a library.    When you are abused by a spouse or partner, you can take actions to protect yourself and your children.  You can increase your safety whether you decide to stay with your spouse or partner or you decide to leave. You may want to make a safety plan and pack a bag ahead of time. This will help you leave quickly when you decide to. Remember, you cannot change your partner's actions, but you can find help for you and your children. No one deserves to be abused.  Follow-up care is a key part of your treatment and safety. Be sure to make and go to all appointments, and call your doctor if you are having " problems. It's also a good idea to know your test results and keep a list of the medicines you take.  How can you care for yourself at home?  Make a plan for your safety   If you decide to stay with your abusive spouse or partner, you can do the following to increase your safety:  Decide what works best to keep you safe in an emergency.  Know who you can call to help you in an emergency.  Decide if you will call the police if you get hurt again. If you can, agree on a signal with your children or neighbor to call the police for you if you need help. You can flash lights or hang something out of a window.  Choose a safe place to go for a short time if you need to leave home. Memorize the address and phone number.  Learn escape routes out of your home in case you need to leave in a hurry. Teach your children different ways to get out of your home quickly if they need to.  If you can, hide or lock up things that can be used as weapons (guns, knives, hammers).  Learn the number of a domestic violence shelter. Talk to the people there about how they can help.  Find out about other community resources that can help you.  Take pictures of bruises or other injuries if you can. You can also take pictures of things your abuser has broken.  Teach your children that violence is never okay. Tell them that they do not deserve to be hurt.  Pack a bag   Prepare a bag with things you will need if you leave suddenly. Leave it with a friend, a relative, or someone else you trust. You could include the following items in the bag:  A set of keys to your home and car.  Emergency phone numbers and addresses.  Money such as cash or checks. You can also ask a friend, a relative, or someone else you trust to hold money for you.  Copies of legal documents such as house and car titles or rent receipts, birth certificates, Social Security card, voter registration, marriage and 's licenses, and your children's health records.  Personal  "items you would need for a few days, such as clothes, a toothbrush, toothpaste, and any medicines you or your children need.  A favorite toy or book for your child or children.  Diapers and bottles, if you have very young children.  Pictures that show signs of abuse and violence. You may also add pictures of your abuser.  If you leave   If you decide to leave, you can take the following steps:  Go to the emergency room at a hospital if you have been hurt.  Think about asking the police to be with you as you leave. They can protect you as you leave your home.  If you decide to leave secretly, remember that activities can be tracked. Your abuser may still have access to your cell phone, email, and credit cards. It may be possible for these to be traced. Always be aware of your surroundings.  If this is an emergency, do not worry about gathering up anything. Just leave--your safety is most important.  If your abuser moves out, change the locks on the doors. If you have a security system, change the access code.  When should you call for help?   Call 911 anytime you think you may need emergency care. For example, call if:    You or someone else has just been abused.     You think you or someone else is in danger of being abused.   Watch closely for changes in your health, and be sure to contact your doctor if you have any problems.  Where can you learn more?  Go to https://www.[employer].net/patiented  Enter A752 in the search box to learn more about \"Intimate Partner Violence Safety Instructions: Care Instructions.\"  Current as of: June 24, 2023               Content Version: 14.0    3652-0706 [employer].   Care instructions adapted under license by your healthcare professional. If you have questions about a medical condition or this instruction, always ask your healthcare professional. [employer] disclaims any warranty or liability for your use of this information.      Learning About " Intimate Partner Violence  What is intimate partner violence?  Intimate partner violence is a type of domestic abuse. It's threatening, emotionally harmful, or violent behavior in a personal relationship. It can happen between past or current partners or spouses. In some relationships both people abuse each other. One partner may be more abusive. Or the abuse may be equal.  Abuse can affect people of any ethnic group, race, or Sikhism. It can affect teens, adults, or the elderly. And it can happen to people of any sexual orientation, gender, or social status.  Abusers use fear, bullying, and threats to control their partners. They may control what their partners do. They may control where their partners go or who they see. They may act jealous, controlling, or possessive. These early signs of abuse may happen soon after the start of the relationship. Sometimes it can be hard to notice abuse at first. But after the relationship becomes more serious, the abuse may get worse.  If you are being abused in your relationship, it's important to get help. The abuse is not your fault. You don't have to face it alone.  Be careful  It may not be safe to take home domestic abuse information like this handout. Some people ask a trusted friend to keep it for them. It's also important to plan ahead and to memorize the phone number of places you can go for help. If you are concerned about your safety, do not use your computer, smartphone, or tablet to read about domestic abuse.   What are the types of intimate partner violence?  Abuse can happen in different ways. Each type can happen on its own or in combination with others.  Emotional abuse  Emotional abuse is a pattern of threats, insults, or controlling behavior. It includes verbal abuse. It goes beyond healthy disagreements in a relationship. It's a sign of an unhealthy relationship.  Do you feel threatened, intimidated, or controlled?  Does your partner:  Threaten your  children, other family members, or pets?  Use jokes meant to embarrass or shame you?  Call you names?  Tell you that you are a bad parent?  Threaten to take away your children?  Threaten to have you or your family members deported?  Control your access to money or other basic needs?  Control what you do, who you see or talk to, or where you go?  Another form of emotional abuse is denying that it is happening. Or the abuser may act like the abuse is no big deal or is your fault.  Sexual abuse  With sexual abuse, abusers may try to convince or force you to have sex. They may force you into sex acts you're not comfortable with. Or they may sexually assault you. Sexual abuse can happen even if you are in a committed relationship.  Physical abuse  Physical abuse means that a partner hits, kicks, or does something else to physically hurt you. Physical abuse that starts with a slap might lead to kicking, shoving, and choking over time. The abuser may also threaten to hurt or kill you.  Stalking  Stalking means that an abuser gives you attention that you do not want and that causes you fear. Examples of stalking include:  Following you.  Showing up at places where the abuser isn't invited, such as at your work or school.  Constantly calling or texting you.  What problems can  to?  Intimate partner violence can be very dangerous. It can cause serious, repeated injury. It can even lead to death.  All forms of abuse can cause long-term health problems from the stress of a violent relationship. Verbal abuse can lead to sexual and physical abuse.  Abuse causes:  Emotional pain.  Depression.  Anxiety.  Post-traumatic stress.  Sexual abuse can lead to sexually transmitted infections (such as HIV/AIDS) and unplanned pregnancy.  Pregnancy can be a very dangerous time for people in abusive relationships. Abuse can cause or increase the risk of problems during pregnancy. These include low weight gain, anemia, infections, and  "bleeding. Abuse may also increase your baby's risk of low birth weight, premature birth, and death.  It can be hard for some victims of abuse to ask for help or to leave their relationship. You may feel scared, stuck, or not sure what steps to take. But it's important not to ignore abuse. Talking to someone you trust could be the first step to ending the abuse and taking care of your own health and happiness again. There are resources available that can help keep you safe.  Where can you get help?  Talk to a trusted friend. Find a local advocacy group, or talk to your doctor about the abuse.  Contact the National Domestic Violence Hotline at 0-628-034-KFJC (1-421.112.5463) for more safety tips. They can guide you to groups in your area that can help. Or go to the National Coalition Against Domestic Violence website at www.thehotlSymcircle.org to learn more.  Domestic violence groups or a counselor in your area can help you make a safety plan for yourself and your children.  When to call for help  Call 911 anytime you think you may need emergency care. For example, call if:  You think that you or someone you know is in danger of being abused.  You have been hurt and can't have someone safely take you to emergency care.  You have just been abused.  A family member has just been abused.  Where can you learn more?  Go to https://www.[employer].net/patiented  Enter S665 in the search box to learn more about \"Learning About Intimate Partner Violence.\"  Current as of: June 24, 2023               Content Version: 14.0    7282-8905 [employer].   Care instructions adapted under license by your healthcare professional. If you have questions about a medical condition or this instruction, always ask your healthcare professional. [employer] disclaims any warranty or liability for your use of this information.      Vaginal Bleeding During Pregnancy: Care Instructions  Overview     It's common to have some " vaginal spotting when you are pregnant. In some cases, the bleeding isn't serious. And there aren't any more problems with the pregnancy.  But sometimes bleeding is a sign of a more serious problem. This is more common if the bleeding is heavy or painful. Examples of more serious problems include miscarriage, an ectopic pregnancy, and a problem with the placenta.  You may have to see your doctor again to be sure everything is okay. You may also need more tests to find the cause of the bleeding.  Home treatment may be all you need. But it depends on what is causing the bleeding. Be sure to tell your doctor if you have any new symptoms or if your symptoms get worse.  The doctor has checked you carefully, but problems can develop later. If you notice any problems or new symptoms, get medical treatment right away.  Follow-up care is a key part of your treatment and safety. Be sure to make and go to all appointments, and call your doctor if you are having problems. It's also a good idea to know your test results and keep a list of the medicines you take.  How can you care for yourself at home?  If your doctor prescribed medicines, take them exactly as directed. Call your doctor if you think you are having a problem with your medicine.  Do not have vaginal sex until your doctor says it's okay.  Do not put anything in your vagina until your doctor says it's okay.  Ask your doctor about other activities you can or can't do.  Get a lot of rest. Being pregnant can make you tired.  Do not use nonsteroidal anti-inflammatory drugs (NSAIDs), such as ibuprofen (Advil, Motrin), naproxen (Aleve), or aspirin, unless your doctor says it is okay.  When should you call for help?   Call 911 anytime you think you may need emergency care. For example, call if:    You passed out (lost consciousness).     You have severe vaginal bleeding. This means you are soaking through a pad each hour for 2 or more hours.     You have sudden, severe pain  "in your belly or pelvis.   Call your doctor now or seek immediate medical care if:    You have new or worse vaginal bleeding.     You are dizzy or lightheaded, or you feel like you may faint.     You have pain in your belly, pelvis, or lower back.     You think that you are in labor.     You have a sudden release of fluid from your vagina.     You've been having regular contractions for an hour. This means that you've had at least 8 contractions within 1 hour or at least 4 contractions within 20 minutes, even after you change your position and drink fluids.     You notice that your baby has stopped moving or is moving much less than normal.   Watch closely for changes in your health, and be sure to contact your doctor if you have any problems.  Where can you learn more?  Go to https://www.[employer].net/patiented  Enter N829 in the search box to learn more about \"Vaginal Bleeding During Pregnancy: Care Instructions.\"  Current as of: July 10, 2023               Content Version: 14.0    0987-0427 [employer].   Care instructions adapted under license by your healthcare professional. If you have questions about a medical condition or this instruction, always ask your healthcare professional. [employer] disclaims any warranty or liability for your use of this information.      "

## 2024-07-25 LAB
ABO/RH(D): NORMAL
ANTIBODY SCREEN: NEGATIVE
SPECIMEN EXPIRATION DATE: NORMAL

## 2024-07-26 ENCOUNTER — LAB (OUTPATIENT)
Dept: LAB | Facility: CLINIC | Age: 29
End: 2024-07-26
Payer: COMMERCIAL

## 2024-07-26 ENCOUNTER — HOSPITAL ENCOUNTER (OUTPATIENT)
Dept: ULTRASOUND IMAGING | Facility: CLINIC | Age: 29
Discharge: HOME | End: 2024-07-26
Attending: STUDENT IN AN ORGANIZED HEALTH CARE EDUCATION/TRAINING PROGRAM | Admitting: STUDENT IN AN ORGANIZED HEALTH CARE EDUCATION/TRAINING PROGRAM
Payer: COMMERCIAL

## 2024-07-26 DIAGNOSIS — Z34.90 SUPERVISION OF NORMAL PREGNANCY: ICD-10-CM

## 2024-07-26 LAB
ERYTHROCYTE [DISTWIDTH] IN BLOOD BY AUTOMATED COUNT: 12.8 % (ref 10–15)
FERRITIN SERPL-MCNC: 59 NG/ML (ref 6–175)
HBA1C MFR BLD: 4.7 %
HBV SURFACE AG SERPL QL IA: NONREACTIVE
HCT VFR BLD AUTO: 38.6 % (ref 35–47)
HCV AB SERPL QL IA: NONREACTIVE
HGB BLD-MCNC: 13.2 G/DL (ref 11.7–15.7)
MCH RBC QN AUTO: 29.1 PG (ref 26.5–33)
MCHC RBC AUTO-ENTMCNC: 34.2 G/DL (ref 31.5–36.5)
MCV RBC AUTO: 85 FL (ref 78–100)
PLATELET # BLD AUTO: 295 10E3/UL (ref 150–450)
RBC # BLD AUTO: 4.54 10E6/UL (ref 3.8–5.2)
RUBV IGG SERPL QL IA: 3.58 INDEX
RUBV IGG SERPL QL IA: POSITIVE
T PALLIDUM AB SER QL: NONREACTIVE
WBC # BLD AUTO: 12.6 10E3/UL (ref 4–11)

## 2024-07-26 PROCEDURE — 83036 HEMOGLOBIN GLYCOSYLATED A1C: CPT

## 2024-07-26 PROCEDURE — 86901 BLOOD TYPING SEROLOGIC RH(D): CPT

## 2024-07-26 PROCEDURE — 86900 BLOOD TYPING SEROLOGIC ABO: CPT

## 2024-07-26 PROCEDURE — 87340 HEPATITIS B SURFACE AG IA: CPT

## 2024-07-26 PROCEDURE — 86803 HEPATITIS C AB TEST: CPT

## 2024-07-26 PROCEDURE — 82728 ASSAY OF FERRITIN: CPT

## 2024-07-26 PROCEDURE — 87389 HIV-1 AG W/HIV-1&-2 AB AG IA: CPT

## 2024-07-26 PROCEDURE — 86850 RBC ANTIBODY SCREEN: CPT

## 2024-07-26 PROCEDURE — 85027 COMPLETE CBC AUTOMATED: CPT

## 2024-07-26 PROCEDURE — 76801 OB US < 14 WKS SINGLE FETUS: CPT

## 2024-07-26 PROCEDURE — 86762 RUBELLA ANTIBODY: CPT

## 2024-07-26 PROCEDURE — 36415 COLL VENOUS BLD VENIPUNCTURE: CPT

## 2024-07-26 PROCEDURE — 86780 TREPONEMA PALLIDUM: CPT

## 2024-07-27 LAB — HIV 1+2 AB+HIV1 P24 AG SERPL QL IA: NONREACTIVE

## 2024-08-06 ENCOUNTER — HOSPITAL ENCOUNTER (OUTPATIENT)
Facility: CLINIC | Age: 29
Discharge: HOME | End: 2024-08-06
Attending: FAMILY MEDICINE | Admitting: FAMILY MEDICINE
Payer: COMMERCIAL

## 2024-08-06 DIAGNOSIS — R11.0 NAUSEA: Primary | ICD-10-CM

## 2024-08-06 PROCEDURE — G0463 HOSPITAL OUTPT CLINIC VISIT: HCPCS | Mod: 25

## 2024-08-06 PROCEDURE — 96374 THER/PROPH/DIAG INJ IV PUSH: CPT

## 2024-08-06 PROCEDURE — 258N000003 HC RX IP 258 OP 636: Performed by: FAMILY MEDICINE

## 2024-08-06 PROCEDURE — 96375 TX/PRO/DX INJ NEW DRUG ADDON: CPT

## 2024-08-06 PROCEDURE — 250N000011 HC RX IP 250 OP 636: Performed by: FAMILY MEDICINE

## 2024-08-06 PROCEDURE — 96360 HYDRATION IV INFUSION INIT: CPT

## 2024-08-06 PROCEDURE — 96361 HYDRATE IV INFUSION ADD-ON: CPT

## 2024-08-06 RX ORDER — ONDANSETRON 4 MG/1
4 TABLET, ORALLY DISINTEGRATING ORAL EVERY 6 HOURS PRN
Status: DISCONTINUED | OUTPATIENT
Start: 2024-08-06 | End: 2024-08-06 | Stop reason: HOSPADM

## 2024-08-06 RX ORDER — ONDANSETRON 4 MG/1
4 TABLET, ORALLY DISINTEGRATING ORAL EVERY 8 HOURS PRN
Qty: 30 TABLET | Refills: 0 | Status: SHIPPED | OUTPATIENT
Start: 2024-08-06

## 2024-08-06 RX ORDER — PROCHLORPERAZINE 25 MG
25 SUPPOSITORY, RECTAL RECTAL EVERY 12 HOURS PRN
Status: DISCONTINUED | OUTPATIENT
Start: 2024-08-06 | End: 2024-08-06 | Stop reason: HOSPADM

## 2024-08-06 RX ORDER — ONDANSETRON 2 MG/ML
4 INJECTION INTRAMUSCULAR; INTRAVENOUS EVERY 6 HOURS PRN
Status: DISCONTINUED | OUTPATIENT
Start: 2024-08-06 | End: 2024-08-06 | Stop reason: HOSPADM

## 2024-08-06 RX ORDER — METOCLOPRAMIDE 5 MG/1
10 TABLET ORAL EVERY 6 HOURS PRN
Status: DISCONTINUED | OUTPATIENT
Start: 2024-08-06 | End: 2024-08-06 | Stop reason: HOSPADM

## 2024-08-06 RX ORDER — PROCHLORPERAZINE MALEATE 5 MG
10 TABLET ORAL EVERY 6 HOURS PRN
Status: DISCONTINUED | OUTPATIENT
Start: 2024-08-06 | End: 2024-08-06 | Stop reason: HOSPADM

## 2024-08-06 RX ORDER — METOCLOPRAMIDE HYDROCHLORIDE 5 MG/ML
10 INJECTION INTRAMUSCULAR; INTRAVENOUS EVERY 6 HOURS PRN
Status: DISCONTINUED | OUTPATIENT
Start: 2024-08-06 | End: 2024-08-06 | Stop reason: HOSPADM

## 2024-08-06 RX ADMIN — METOCLOPRAMIDE 10 MG: 5 INJECTION, SOLUTION INTRAMUSCULAR; INTRAVENOUS at 12:38

## 2024-08-06 RX ADMIN — ONDANSETRON 4 MG: 2 INJECTION INTRAMUSCULAR; INTRAVENOUS at 12:51

## 2024-08-06 RX ADMIN — SODIUM CHLORIDE, POTASSIUM CHLORIDE, SODIUM LACTATE AND CALCIUM CHLORIDE 1000 ML: 600; 310; 30; 20 INJECTION, SOLUTION INTRAVENOUS at 12:36

## 2024-08-06 ASSESSMENT — ACTIVITIES OF DAILY LIVING (ADL)
ADLS_ACUITY_SCORE: 18

## 2024-08-06 NOTE — PROGRESS NOTES
S:  Discharge from triage.   A: FHTs not assessed as patient is onlyu 8 weeks gestation. Nausea/vomiting resolved after fluids and Zofran.  R:  Written and verbal D/C instruction provided. Pt. Verbalized understanding of D/C instructions and will follow up with provider as previously scheduled.   Instructed to go to ER if feelings of nausea in the future until 20 weeks.  Pt. D/C'd via walking with mom    Nursing Discharge Checklist  Discharge order entered: Yes  Patient care order entered: Yes  Charges entered: Yes  IV start and stop times have been documented in Epic? Yes  NST start and stop times have been documented in Epic Doc F/S? No

## 2024-08-06 NOTE — DISCHARGE INSTRUCTIONS
OB Triage Discharge Instructions    Diet:  Regular diet as tolerated    Activity:  As tolerated    Other Special Instructions: return to ER if another nausea/vomiting episode    Reason for being seen in L&D: nausea/vomiting    Treatment/procedures performed in L&D: fluids, anti-nausea meds    Call [facility] at [phone] if you have:  5 or more contractions in one hour  Leaking of fluid from your vaginal area  Decreased fetal movement (follow kick count instructions)  Bleeding from your vaginal area  Swelling in your face, or increased swelling in your hands or legs  Headaches or vision problems such as blurring or seeing spots or starts  Nausea or vomiting lasting for more than 24 hours  An increase in weight (5lbs/week)  Epigastric pain (sometimes confused with heartburn that does not go away or a very bad stomach ache)    If you have any questions, please follow up with your doctor.        Patient Signature: ______________________________________________________________  By signing the above I acknowledge that a nurse or my care provider has explained the instruction to me and I have had any questions regarding my care explained to me.        Discharge Nurse Signature: _______________________________ 8/6/2024  2:18 PM    Method of discharge: walking    Accompanied by: mom  Time of discharge: 1420

## 2024-08-06 NOTE — PROGRESS NOTES
S: Triage Arrival  B: Shireen is a 28 y.o.  @ 8w 5d who presents with complaint of nausea/vomiting for 4-5 days.  A: Dr. Leung notified. LR bolus initiated. Reglan given.  R: Encourage rest while IV hydration.

## 2024-08-14 ENCOUNTER — PRENATAL OFFICE VISIT (OUTPATIENT)
Dept: FAMILY MEDICINE | Facility: CLINIC | Age: 29
End: 2024-08-14
Payer: COMMERCIAL

## 2024-08-14 VITALS
TEMPERATURE: 99 F | RESPIRATION RATE: 12 BRPM | OXYGEN SATURATION: 100 % | HEIGHT: 64 IN | HEART RATE: 73 BPM | SYSTOLIC BLOOD PRESSURE: 122 MMHG | WEIGHT: 166.2 LBS | BODY MASS INDEX: 28.38 KG/M2 | DIASTOLIC BLOOD PRESSURE: 74 MMHG

## 2024-08-14 DIAGNOSIS — J45.990 EXERCISE-INDUCED ASTHMA: ICD-10-CM

## 2024-08-14 DIAGNOSIS — N02.B9 IGA NEPHROPATHY: ICD-10-CM

## 2024-08-14 DIAGNOSIS — E66.3 OVERWEIGHT WITH BODY MASS INDEX (BMI) OF 28 TO 28.9 IN ADULT: ICD-10-CM

## 2024-08-14 DIAGNOSIS — O21.9 NAUSEA AND VOMITING IN PREGNANCY PRIOR TO 22 WEEKS GESTATION: ICD-10-CM

## 2024-08-14 DIAGNOSIS — Z34.01 ENCOUNTER FOR SUPERVISION OF NORMAL FIRST PREGNANCY IN FIRST TRIMESTER: Primary | ICD-10-CM

## 2024-08-14 PROCEDURE — 99215 OFFICE O/P EST HI 40 MIN: CPT | Performed by: STUDENT IN AN ORGANIZED HEALTH CARE EDUCATION/TRAINING PROGRAM

## 2024-08-14 PROCEDURE — 99417 PROLNG OP E/M EACH 15 MIN: CPT | Performed by: STUDENT IN AN ORGANIZED HEALTH CARE EDUCATION/TRAINING PROGRAM

## 2024-08-14 RX ORDER — PYRIDOXINE HCL (VITAMIN B6) 25 MG
25 TABLET ORAL DAILY
Qty: 45 TABLET | Refills: 1 | Status: SHIPPED | OUTPATIENT
Start: 2024-08-14

## 2024-08-14 ASSESSMENT — PAIN SCALES - GENERAL: PAINLEVEL: NO PAIN (0)

## 2024-08-14 NOTE — PROGRESS NOTES
Concerns: ***  {[id number]}  Will schedule anatomy ultrasound.  RTC 4 weeks.      Chiquita Peterson, DO

## 2024-08-14 NOTE — PROGRESS NOTES
Shireen Thakur is a 28 year old female with ANDRESSA of Mar 18, 2025 by LMP, early ultrasound here for first OB visit.   She has completed appointment with the OB educator.    Her history is reviewed. Please see OB flowsheets.     First pregnancy  - 28-year-old female, approximately 9 weeks and one day pregnant  - Reports a history of IgA nephropathy  - Experiences nausea early in the morning until after midnight  - Currently taking Zofran for nausea, but considering switching to B6 and doxylamine  - Has a history of exercise-induced asthma, but no recent issues or inhaler use for at least in the past three years. Was a problem intermittently in college.   - Reports no current mood problems  - No abnormal discharge  - Recommend continue prenatal vitamins and maintaining a diet with an increased daily calorie intake by at least 500 during pregnancy  - Discussed LMP 06-26-24, early US, and confirmed due date is 03-18-25 based on these  - Discussed potential dietary options to manage nausea and continuing appropriate weight gain including protein shakes and high-protein snacks  - Discussed weight gain goals during pregnancy and the importance of consistent increase made associated growth of baby and the uterus  - Discussed potential genetic screening tests, but patient is unsure due to potential cost  - Patient is A positive, so no concerns about RH negative  - Patient lives in [place], midway between [place] and the current location  - Patient is considering options for pediatrician and whether to have a circumcision if the baby is a boy  - Patient is considering options for birth control after pregnancy and whether to breastfeed  - Discussed potential vaccinations during pregnancy, including Tetanus at 28 weeks, RSV at 30 weeks, and COVID and flu when available  - Discussed sugar testing and hemoglobin check at around 28 weeks  - FOCARITO works at [employer] in [place].    Past medical history  IGA  nephropathy  History of exercise-induced asthma    Family history  No known family history of birth abnormalities, bleeding abnormalities, clotting abnormalities    Current medications  Prenatal vitamins  Zofran for nausea and vomiting    Lab results  Creatinine level from May 18th was above 70, indicating good kidney function and literature suggests minimal concerns for impact on pregnancy for fetus and mother in the setting of IGA nephropathy with this level    Imaging results  Ultrasound on July 26th showed pregnancy at 6 weeks, 3 days with ANDRESSA of 3-18-25              Patient Active Problem List   Diagnosis    IgA nephropathy    Nausea    History of asthma                      OB History:  See under specialty history                   Current Outpatient Medications   Medication Sig Dispense Refill    doxylamine (UNISOM) 25 MG TABS tablet Take 1 tablet (25 mg) by mouth at bedtime 45 tablet 1    ondansetron (ZOFRAN ODT) 4 MG ODT tab Take 1 tablet (4 mg) by mouth every 8 hours as needed for nausea 30 tablet 0    pyridOXINE (VITAMIN B6) 25 MG tablet Take 1 tablet (25 mg) by mouth daily 45 tablet 1     No current facility-administered medications for this visit.                  No Known Allergies       (Z34.01) Encounter for supervision of normal first pregnancy in first trimester  (primary encounter diagnosis)  Discussed expected OB course.  Discussed indications for pelvic exam, pap smear, and vaginal swabs and ordered/performed today or deferred until next visit as appropriate.    Continue on Prenatal vitamins.  Discussed appropriate diet and weight gain throughout pregnancy.  Body mass index is 28.20 - recommend 15 - 25 lbs  First trimester U/S reviewed.  Ordering Anatomy U/S for 17-22 wga.  Discussed NIPS genetic screen: Will look into, if too costly will decline.   Discussed genetic screening options, including sequential and quad testing/AFP, cell-free DNA as well as diagnostic testing including amniocentesis.  Patient would like to think about further, will discuss next visit  Discussed Cystic Fibrosis and SMA carrier screening, patient would like to think about further, will discuss next visit  Discussed ordered labs and ultrasound, all questions answered.    Folder given, outlining exercise, weight gain, schedule of visits, routine and indicated ultrasounds, prenatal vitamins and childbirth education.  Desires delivery at [facility]  Return in 4 weeks for routine OB care    Pregnancy risks              1.  Nullip               2.  Obesity:  BMI 28.20.  Consider antepartum testing (BPP) 37 weeks once weekly, consider growth us at 32 weeks. Goal for weight gain during the pregnancy is 15-25 lbs.              3.  IgA Nephropathy:  Most women with IgA nephropathy should anticipate few problems with pregnancy, if they are normotensive and their preconception GFR exceeds 70 ml/min. The gestation in such instances should have little influence on the natural history of their nephropathy   4. History of EIA: Recommend avoid carboprost for PPH.         Prenatal care plan              - ANDRESSA:  3/18/25              - pregravid BMI: 28.00              - OB labs:  Prenatal Labs:   Lab Results   Component Value Date    AS Negative 07/26/2024    HEPBANG Nonreactive 07/26/2024    HGB 13.2 07/26/2024                  - First trimester screening:  Discussed              - Second trimester screening:  Discussed               - Pain management:  Will discuss at the future visit              - Ped:  Will discuss at the future visit              - Circumcision if boy: Will discuss at the future visit              - BC: Will discuss at the future visit              - Breast feeding:  Will discuss in the future visits              - Vaccinations: Tdap/Flu/Covid/RSV                        - 1 hour GTT        (O21.9) Nausea and vomiting in pregnancy prior to 22 weeks gestation  Patient experiencing nausea and vomiting, likely due to  pregnancy.  Switch from Zofran to B6 and doxylamine for management of symptoms. Prescription provided for both medications.  - pyridOXINE (VITAMIN B6) 25 MG tablet,  doxylamine (UNISOM) 25 MG TABS tablet     (N02.B9) IgA nephropathy  Patient has a history of IGA nephropathy. Recent lab results show good kidney function.  No additional screening or tests needed at this time unless patient starts showing symptoms of kidney problems. Per literature: Most women with IgA nephropathy should anticipate few problems with pregnancy, if they are normotensive and their preconception GFR exceeds 70 ml/min. The gestation in such instances should have little influence on the natural history of their nephropathy    (J45.990) History of exercise-induced asthma  Patient has a history of exercise-induced asthma but has not had any issues in the past three years or more and has not required inhalers.   Monitor for any breathing issues during pregnancy.  Will recommend not use carboprost for postpartum hemorrhage as a precaution.    (E66.3,  Z68.28) Overweight with body mass index (BMI) of 28 to 28.9 in adult    Chiquita Peterson, DO     79 minutes spent by me on the date of the encounter doing chart review, history and exam, documentation and further activities per the note

## 2024-08-15 PROBLEM — Z87.09 HISTORY OF ASTHMA: Status: ACTIVE | Noted: 2024-08-15

## 2024-08-24 ENCOUNTER — HEALTH MAINTENANCE LETTER (OUTPATIENT)
Age: 29
End: 2024-08-24

## 2024-09-11 ENCOUNTER — TELEPHONE (OUTPATIENT)
Dept: FAMILY MEDICINE | Facility: CLINIC | Age: 29
End: 2024-09-11
Payer: COMMERCIAL

## 2024-09-11 DIAGNOSIS — Z34.01 ENCOUNTER FOR SUPERVISION OF NORMAL FIRST PREGNANCY IN FIRST TRIMESTER: Primary | ICD-10-CM

## 2024-09-11 NOTE — TELEPHONE ENCOUNTER
Order/Referral Request    Who is requesting: Patient     Orders being requested: Ultrasound    Reason service is needed/diagnosis: With child     When are orders needed by: ASAP    Has this been discussed with Provider: Yes    Does patient have a preference on a Group/Provider/Facility? N/A     Does patient have an appointment scheduled?: No    Where to send orders:  [facility]     Could we send this information to you in [facility] or would you prefer to receive a phone call?:   Patient would prefer a phone call   Okay to leave a detailed message?: Yes at Cell number on file:    Telephone Information:   Mobile [phone]

## 2024-09-13 NOTE — TELEPHONE ENCOUNTER
I'm not certain I understand the request. Patient had 1st trimester ultrasound and orders were placed for the anatomy ultrasound. If a different ultrasound is being requested I would need more specifics as to the indications, in which case a visit may be required. Potentially a phone or virtual visit would be reasonable dependant on the rationale.     Chiquita Peterson, DO

## 2024-09-13 NOTE — TELEPHONE ENCOUNTER
Order placed again for anatomy US. Patient should be able to call to schedule.    Chiquita Peterson, DO

## 2024-09-13 NOTE — TELEPHONE ENCOUNTER
Attempted contacting patient, no answer, left a message asking for a return call.    Rima Arora, VF

## 2024-09-13 NOTE — TELEPHONE ENCOUNTER
Patient was previously attempting to contact imaging to schedule that anatomy ultrasound at 20 weeks. Imaging told her there were no orders. Nursing staff has reviewed, we can not find orders.     Please place orders for ultrasound so patient can schedule.     Simon Alexander RN on 9/13/2024 at 2:42 PM

## 2024-09-14 ENCOUNTER — MYC MEDICAL ADVICE (OUTPATIENT)
Dept: FAMILY MEDICINE | Facility: CLINIC | Age: 29
End: 2024-09-14
Payer: COMMERCIAL

## 2024-09-16 NOTE — TELEPHONE ENCOUNTER
RN Triage    Patient Contact    Attempt # 1    Was call answered?  No.  Left message on voicemail with information to call me back. and MyChart.     Simon Alexander RN on 9/16/2024 at 2:08 PM

## 2024-09-16 NOTE — TELEPHONE ENCOUNTER
RN TRIAGE CALL:    Patient Contact    Attempt # 1    Was call answered?  No.  Left message on voicemail with information orders have been placed for her requested radiology appointment.  Left message that she should be able to call and schedule that appointment at this time.  Left message to call back with any further questions or concerns.    Nohelia Butts, RN

## 2024-09-17 NOTE — TELEPHONE ENCOUNTER
RN Triage    Patient Contact    Attempt # 2    Was call answered?  No.  Left message on voicemail with information to call me back.     FEDERICO Katz, RN

## 2024-09-18 NOTE — TELEPHONE ENCOUNTER
RN Triage    Patient Contact    Attempt # 3    Was call answered?  No.  Left message on voicemail with information to call me back. [employer] message also sent.    FEDERICO Katz, RN